# Patient Record
Sex: FEMALE | Race: AMERICAN INDIAN OR ALASKA NATIVE
[De-identification: names, ages, dates, MRNs, and addresses within clinical notes are randomized per-mention and may not be internally consistent; named-entity substitution may affect disease eponyms.]

---

## 2018-08-09 ENCOUNTER — HOSPITAL ENCOUNTER (EMERGENCY)
Dept: HOSPITAL 5 - ED | Age: 48
LOS: 1 days | Discharge: HOME | End: 2018-08-10
Payer: COMMERCIAL

## 2018-08-09 DIAGNOSIS — G43.909: ICD-10-CM

## 2018-08-09 DIAGNOSIS — N12: Primary | ICD-10-CM

## 2018-08-09 PROCEDURE — 96365 THER/PROPH/DIAG IV INF INIT: CPT

## 2018-08-09 PROCEDURE — 81001 URINALYSIS AUTO W/SCOPE: CPT

## 2018-08-09 PROCEDURE — 74177 CT ABD & PELVIS W/CONTRAST: CPT

## 2018-08-09 PROCEDURE — 96375 TX/PRO/DX INJ NEW DRUG ADDON: CPT

## 2018-08-09 PROCEDURE — 83690 ASSAY OF LIPASE: CPT

## 2018-08-09 PROCEDURE — 85025 COMPLETE CBC W/AUTO DIFF WBC: CPT

## 2018-08-09 PROCEDURE — 36415 COLL VENOUS BLD VENIPUNCTURE: CPT

## 2018-08-09 PROCEDURE — 80053 COMPREHEN METABOLIC PANEL: CPT

## 2018-08-09 PROCEDURE — 84703 CHORIONIC GONADOTROPIN ASSAY: CPT

## 2018-08-09 PROCEDURE — 99284 EMERGENCY DEPT VISIT MOD MDM: CPT

## 2018-08-10 VITALS — DIASTOLIC BLOOD PRESSURE: 55 MMHG | SYSTOLIC BLOOD PRESSURE: 102 MMHG

## 2018-08-10 LAB
ALBUMIN SERPL-MCNC: 3.9 G/DL (ref 3.9–5)
ALT SERPL-CCNC: 7 UNITS/L (ref 7–56)
BACTERIA #/AREA URNS HPF: (no result) /HPF
BASOPHILS # (AUTO): 0.1 K/MM3 (ref 0–0.1)
BASOPHILS NFR BLD AUTO: 0.5 % (ref 0–1.8)
BILIRUB UR QL STRIP: (no result)
BLOOD UR QL VISUAL: (no result)
BUN SERPL-MCNC: 11 MG/DL (ref 7–17)
BUN/CREAT SERPL: 14 %
CALCIUM SERPL-MCNC: 8.8 MG/DL (ref 8.4–10.2)
EOSINOPHIL # BLD AUTO: 0.1 K/MM3 (ref 0–0.4)
EOSINOPHIL NFR BLD AUTO: 0.7 % (ref 0–4.3)
HCT VFR BLD CALC: 24.2 % (ref 30.3–42.9)
HEMOLYSIS INDEX: 0
HGB BLD-MCNC: 6.6 GM/DL (ref 10.1–14.3)
LIPASE SERPL-CCNC: 18 UNITS/L (ref 13–60)
LYMPHOCYTES # BLD AUTO: 1.3 K/MM3 (ref 1.2–5.4)
LYMPHOCYTES NFR BLD AUTO: 12 % (ref 13.4–35)
MCH RBC QN AUTO: 15 PG (ref 28–32)
MCHC RBC AUTO-ENTMCNC: 27 % (ref 30–34)
MCV RBC AUTO: 55 FL (ref 79–97)
MONOCYTES # (AUTO): 1 K/MM3 (ref 0–0.8)
MONOCYTES % (AUTO): 9.2 % (ref 0–7.3)
MUCOUS THREADS #/AREA URNS HPF: (no result) /HPF
PH UR STRIP: 5 [PH] (ref 5–7)
PLATELET # BLD: 275 K/MM3 (ref 140–440)
RBC # BLD AUTO: 4.37 M/MM3 (ref 3.65–5.03)
RBC #/AREA URNS HPF: 26 /HPF (ref 0–6)
UROBILINOGEN UR-MCNC: < 2 MG/DL (ref ?–2)
WBC #/AREA URNS HPF: > 182 /HPF (ref 0–6)

## 2018-08-10 NOTE — CAT SCAN REPORT
FINAL REPORT



EXAM:  CT ABDOMEN PELVIS W CON



HISTORY:  abdominal pain 



TECHNIQUE:  CT evaluation performed of the abdomen and pelvis

following IV and oral contrast administration. Additional axial

delayed images were acquired. Coronal and sagittal imaging also

provided for interpretation. 



PRIORS:  None.



FINDINGS:  

Lower thorax: The lung bases are clear. The visualized portions

of the heart are normal.



Liver: No focal lesions identified of the liver. No intrahepatic

biliary ductal dilation.



Gallbladder/ biliary system: No cholelithiasis. No extrahepatic

biliary ductal dilation.



Spleen: No splenic lesions are seen.



Pancreas: No pancreatic lesions are seen. No pancreatic duct

dilation.



Kidneys: There is a heterogeneous appearance of the right kidney

with peripheral striated decreased enhancement. There is

additional mild prominence of the proximal collecting system with

suggested increased mucosal enhancement and periureteral

stranding (axial images 62-75). There is no focal ureteral

calculi identified.



Adrenal glands: No adrenal masses.



Vasculature: The abdominal and pelvic vasculature demonstrates a

normal noncontrasted appearance.



Bowel, mesentery, peritoneum: No bowel obstruction. No colonic

diverticulosis. The appendix is not visualized, however there is

no inflammatory change in the right lower quadrant. No free

intra-abdominal fluid or air.



Urinary bladder: No calculi or wall thickening.



Pelvis: The uterus is markedly enlarged measuring approximately

8.9 cm anterior to posterior by 13.7 cm superior to inferior

(sagittal image 129). There are numerous uterine fibroids which

results in mass effect on the endometrial stripe and focal

peripherally calcified fibroid in the inferior left uterus. No

adjacent inflammatory change identified. 



Abdominal wall: No abdominal wall hernia or subcutaneous

findings. Trace free fluid. 



Bones: Sigmoid thoracolumbar curvature with multilevel endplate

degenerative changes.



IMPRESSION:  

Findings suggestive of pyelonephritis given the abnormal

enhancement pattern of the right kidney. There is additional mild

dilatation of the proximal collecting system with adjacent

inflammatory stranding. Additional re-recently passed calculus

can have a similar appearance. There is no focal calcification

identified within the distal ureter which is decompressed.

Correlation with clinical exam requested. 



Markedly enlarged, fibroid uterus. Non urgent GYN consultation

requested.

## 2018-08-10 NOTE — EMERGENCY DEPARTMENT REPORT
ED Abdominal Pain HPI





- General


Chief Complaint: Abdominal Pain


Stated Complaint: SEVERE STOMACH PAIN


Time Seen by Provider: 08/10/18 01:06


Source: patient


Mode of arrival: Ambulatory


Limitations: No Limitations





- History of Present Illness


Initial Comments: 





Patient is a 47-year-old female with no significant past medical history.  

Patient presented to the ER complaining of right lower quadrant abdominal pain 

for the last 3 days associated with his nausea.  Patient stated that she had 

appendectomy when she was 5 years old.  Patient denied any fever, diarrhea or 

vomiting.


MD Complaint: abdominal pain


Location: RLQ, suprapubic


Radiation: none


Migration to: no migration


Severity scale (0 -10): 7


Quality: sharp


Consistency: constant





- Related Data


 Home Medications











 Medication  Instructions  Recorded  Confirmed  Last Taken


 


No Known Home Medications [No  03/05/15 03/05/15 Unknown





Reported Home Medications]    











 Allergies











Allergy/AdvReac Type Severity Reaction Status Date / Time


 


No Known Allergies Allergy   Verified 03/05/15 10:10














ED Review of Systems


ROS: 


Stated complaint: SEVERE STOMACH PAIN


Other details as noted in HPI





Comment: All other systems reviewed and negative


Constitutional: denies: chills, fever


Respiratory: denies: cough, orthopnea, shortness of breath, SOB with exertion, 

SOB at rest, wheezing


Cardiovascular: denies: chest pain, palpitations, dyspnea on exertion


Gastrointestinal: abdominal pain, nausea.  denies: vomiting, diarrhea, 

constipation, hematemesis, hematochezia


Genitourinary: frequency


Musculoskeletal: denies: back pain


Neurological: denies: headache, weakness, numbness, paresthesias, confusion, 

abnormal gait





ED Past Medical Hx





- Past Medical History


Hx Headaches / Migraines: Yes





- Surgical History


Hx Appendectomy: Yes





- Social History


Smoking Status: Never Smoker


Substance Use Type: None





- Medications


Home Medications: 


 Home Medications











 Medication  Instructions  Recorded  Confirmed  Last Taken  Type


 


No Known Home Medications [No  03/05/15 03/05/15 Unknown History





Reported Home Medications]     














ED Physical Exam





- General


Limitations: No Limitations


General appearance: alert, in no apparent distress





- Head


Head exam: Present: atraumatic, normocephalic, normal inspection





- Eye


Eye exam: Present: normal appearance





- ENT


ENT exam: Present: normal exam, normal orophraynx, mucous membranes moist





- Neck


Neck exam: Present: normal inspection, full ROM.  Absent: tenderness, 

meningismus





- Respiratory


Respiratory exam: Present: normal lung sounds bilaterally





- Cardiovascular


Cardiovascular Exam: Present: regular rate, normal rhythm, normal heart sounds





- GI/Abdominal


GI/Abdominal exam: Present: soft, tenderness (right lower quadrant tenderness), 

normal bowel sounds.  Absent: distended, guarding, rebound, rigid, organomegaly

, mass, bruit, pulsatile mass, hernia





- Extremities Exam


Extremities exam: Present: normal inspection, full ROM, normal capillary 

refill.  Absent: tenderness, pedal edema, joint swelling, calf tenderness





- Back Exam


Back exam: Present: normal inspection, full ROM.  Absent: tenderness, CVA 

tenderness (R), CVA tenderness (L), muscle spasm, paraspinal tenderness, 

vertebral tenderness





- Neurological Exam


Neurological exam: Present: alert, oriented X3, CN II-XII intact, normal gait, 

reflexes normal





- Skin


Skin exam: Present: warm, intact, normal color





ED Course


 Vital Signs











  08/09/18 08/10/18





  23:11 02:42


 


Temperature 98.8 F 


 


Pulse Rate 88 


 


Respiratory 18 





Rate  


 


Blood Pressure 126/72 





[Right]  


 


O2 Sat by Pulse 97 100





Oximetry  














ED Medical Decision Making





- Lab Data


Result diagrams: 


 18 23:37





 18 23:37





- Radiology Data


Radiology results: report reviewed





Referring Physician:   LUBNA RICH


Patient Name:   TIGRE OSEI


Patient ID:   [id number]


YOB: 1970


Sex:   Female


Accession:   [id number]


Report Date:   2018-08-10


Report Status:   Finalized


Findings


[place] [address] 





Cat Scan Report 


Signed 





Patient: TIGRE OSEI MR#: [id number] 


: 1970 Acct:[id number] 


Age/Sex: 47 / F ADM Date: 18 


Loc: ED 


Attending Dr: 








Ordering Physician: LUBNA RICH 


Date of Service: 08/10/18 


Procedure(s): CT abdomen pelvis w con 


Accession Number(s): [id number] 





cc: LUBNA RICH 








FINAL REPORT 





EXAM: CT ABDOMEN PELVIS W CON 





HISTORY: abdominal pain 





TECHNIQUE: CT evaluation performed of the abdomen and pelvis 


following IV and oral contrast administration. Additional axial 


delayed images were acquired. Coronal and sagittal imaging also 


provided for interpretation. 





PRIORS: None. 





FINDINGS: 


Lower thorax: The lung bases are clear. The visualized portions 


of the heart are normal. 





Liver: No focal lesions identified of the liver. No intrahepatic 


biliary ductal dilation. 





Gallbladder/ biliary system: No cholelithiasis. No extrahepatic 


biliary ductal dilation. 





Spleen: No splenic lesions are seen. 





Pancreas: No pancreatic lesions are seen. No pancreatic duct 


dilation. 





Kidneys: There is a heterogeneous appearance of the right kidney 


with peripheral striated decreased enhancement. There is 


additional mild prominence of the proximal collecting system with 


suggested increased mucosal enhancement and periureteral 


stranding (axial images 62-75). There is no focal ureteral 


calculi identified. 





Adrenal glands: No adrenal masses. 





Vasculature: The abdominal and pelvic vasculature demonstrates a 


normal noncontrasted appearance. 





Bowel, mesentery, peritoneum: No bowel obstruction. No colonic 


diverticulosis. The appendix is not visualized, however there is 


no inflammatory change in the right lower quadrant. No free 


intra-abdominal fluid or air. 





Urinary bladder: No calculi or wall thickening. 





Pelvis: The uterus is markedly enlarged measuring approximately 


8.9 cm anterior to posterior by 13.7 cm superior to inferior 


(sagittal image 129). There are numerous uterine fibroids which 


results in mass effect on the endometrial stripe and focal 


peripherally calcified fibroid in the inferior left uterus. No 


adjacent inflammatory change identified. 





Abdominal wall: No abdominal wall hernia or subcutaneous 


findings. Trace free fluid. 





Bones: Sigmoid thoracolumbar curvature with multilevel endplate 


degenerative changes. 





IMPRESSION: 


Findings suggestive of pyelonephritis given the abnormal 


enhancement pattern of the right kidney. There is additional mild 


dilatation of the proximal collecting system with adjacent 


inflammatory stranding. Additional re-recently passed calculus 


can have a similar appearance. There is no focal calcification 


identified within the distal ureter which is decompressed. 


Correlation with clinical exam requested. 





Markedly enlarged, fibroid uterus. Non urgent GYN consultation 


requested. 











Transcribed By: SREEDHAR 


Dictated By: LASHELL SHERWOOD DO 


Electronically Authenticated By: LASHELL SHERWOOD DO 


Signed Date/Time: 08/10/18 0320 











DD/DT: 08/10/18 0320 


TD/TT: 08/10/18 0320





- Medical Decision Making





Patient stated that she is feeling much better.  Her pain is completely 

resolved.  I informed the patient and her  about the CT abdomen and 

pelvis results which showed a right pyelonephritis.  I started patient on 

Levaquin and I advised her to follow-up with her primary care physician in the 

next 2-3 days.


Critical care attestation.: 


If time is entered above; I have spent that time in minutes in the direct care 

of this critically ill patient, excluding procedure time.








ED Disposition


Clinical Impression: 


 Acute pyelonephritis, Abdominal pain





Disposition:  TO HOME OR SELFCARE


Is pt being admited?: No


Condition: Stable


Instructions:  Abdominal Pain (ED), Acute Pyelonephritis (ED), Urinary Tract 

Infection in Women (ED)


Referrals: 


PRIMARY CARE,MD [Primary Care Provider] - 3-5 Days